# Patient Record
Sex: MALE | Race: WHITE
[De-identification: names, ages, dates, MRNs, and addresses within clinical notes are randomized per-mention and may not be internally consistent; named-entity substitution may affect disease eponyms.]

---

## 2018-10-01 ENCOUNTER — HOSPITAL ENCOUNTER (OUTPATIENT)
Dept: HOSPITAL 35 - PAIN | Age: 73
End: 2018-10-01
Attending: ANESTHESIOLOGY
Payer: COMMERCIAL

## 2018-10-01 VITALS — BODY MASS INDEX: 25.17 KG/M2 | WEIGHT: 156.6 LBS | HEIGHT: 65.98 IN

## 2018-10-01 VITALS — SYSTOLIC BLOOD PRESSURE: 135 MMHG | DIASTOLIC BLOOD PRESSURE: 96 MMHG

## 2018-10-01 DIAGNOSIS — M54.5: ICD-10-CM

## 2018-10-01 DIAGNOSIS — M54.16: Primary | ICD-10-CM

## 2018-10-01 DIAGNOSIS — G89.29: ICD-10-CM

## 2019-06-17 ENCOUNTER — HOSPITAL ENCOUNTER (OUTPATIENT)
Dept: HOSPITAL 35 - PAIN | Age: 74
Discharge: HOME | End: 2019-06-17
Attending: ANESTHESIOLOGY
Payer: COMMERCIAL

## 2019-06-17 VITALS — SYSTOLIC BLOOD PRESSURE: 162 MMHG | DIASTOLIC BLOOD PRESSURE: 99 MMHG

## 2019-06-17 VITALS — HEIGHT: 65.98 IN | WEIGHT: 167.8 LBS | BODY MASS INDEX: 26.97 KG/M2

## 2019-06-17 DIAGNOSIS — M54.16: Primary | ICD-10-CM

## 2019-06-17 DIAGNOSIS — G89.29: ICD-10-CM

## 2019-06-17 DIAGNOSIS — Z98.890: ICD-10-CM

## 2019-06-17 DIAGNOSIS — M19.90: ICD-10-CM

## 2019-06-17 DIAGNOSIS — Z79.891: ICD-10-CM

## 2019-06-17 DIAGNOSIS — N40.1: ICD-10-CM

## 2019-06-17 NOTE — NUR
Pain Clinic Assessment:
 
1. History of Osteoarthritis:
KNEES, HANDS
   History of Rheumatoid Arthritis:
Not Applicable
 
2. Height: 5 ft. 6 in. 167.6 cm.
   Weight: 167.8 lb.  oz. 76.114 kg.
   Patient's BMI: 27.1
 
3. Vital Signs:
   BP: 162/99 Pulse: 79 Resp: 14
   Temp:  02 Sat: 92 ECG Mon:
 
4. Pain Intensity: 4-5
 
5. Fall Risk:
   Dizziness: N  Needs help standing or walking: N
   Fallen in the last 3 months: N
   Fall risk comments:
 
 
6. Patient on Blood Thinner: None
 
7. History of Hypertension: N
 
8. Opioid Therapy greater than 6 weeks: N
   Opiate Contract Signed:
 
9. Risk Assessment Tool Provided: 3-low risk
 
10. Functional Assessment Tool: 13/70
 
11. Recreational Drug Use: Never Drug Type:
    Tobacco Use: Never Smoker Tobacco Type:
       Amount or Packs/day:  How Many Years:
    Alcohol Use: Yes  Frequency:  Quant:

## 2019-09-05 ENCOUNTER — HOSPITAL ENCOUNTER (OUTPATIENT)
Dept: HOSPITAL 35 - PAIN | Age: 74
End: 2019-09-05
Attending: ANESTHESIOLOGY
Payer: COMMERCIAL

## 2019-09-05 VITALS — DIASTOLIC BLOOD PRESSURE: 97 MMHG | SYSTOLIC BLOOD PRESSURE: 142 MMHG

## 2019-09-05 VITALS — HEIGHT: 65 IN | BODY MASS INDEX: 26.29 KG/M2 | WEIGHT: 157.8 LBS

## 2019-09-05 DIAGNOSIS — M54.16: Primary | ICD-10-CM

## 2019-09-05 NOTE — HPC
[facility]
[address]
[place]   [address]                     PAIN MANAGEMENT CONSULTATION  
_______________________________________________________________________________
 
Name:       CINDY DAMON          Room #:                     REG CLI 
M.R.#:      [id number]                       Account #:      [id number]  
Admission:  09/05/19    Attend Phys:    Brayan Stewart MD 
Discharge:              Date of Birth:  [birth year]  
                                                          Report #: [id number]
                                                                    [id number]   
_______________________________________________________________________________
THIS REPORT FOR:   //name//                          
 
CC: STEPH Stewart
 
DATE OF SERVICE:  09/05/2019
 
 
Followup visit for right lumbar radiculopathy, status post L4-L5 posterior
fusion.
 
The patient returns to pain clinic today for consideration of an additional
epidural injection.  He had a nice response to his initial injection performed
on 06/17/2019.  He had almost complete pain relief for over a month.  Pain is
now gradually returning back to baseline.  He reports he is still about 50%
better.  Beginning in his right thigh, it radiates down to the right ankle
following a dermatomal distribution of L5.  There is no weakness.
 
PQRS REVIEW:
1.  History of osteoarthritis involving knees and hands.
2.  BMI has remained stable at 26.3.
3.  Pain intensity 2/10.
4.  No blood thinners.
5.  No history of hypertension.
6.  No opioid use.  Opioid risk tool score is 3/10.
7.  Functional assessment score 13/70 and unchanged.
8.  Denies use of tobacco, drinks alcohol in social settings.
 
PHYSICAL EXAMINATION:
VITAL SIGNS:  As noted above.
GENERAL:  He moves independently from sitting to standing position, walks with
mild antalgic features today.  He has no local numbness or weakness.  Deep
tendon reflexes are absent bilaterally at knees and ankles.  Positive straight
leg raising is noted following the L5 distribution on the right.
 
IMPRESSION:  Lumbar radiculopathy, right L5-S1.  He has had a previous
laminectomy, facetectomy and pedicle screw instrumentation that is unilateral on
the right.  We will proceed today with epidural injection under fluoroscopic
guidance using a transforaminal approach.
 
PROCEDURE:  He was taken to fluoroscopic suite, placed prone, skin prepped with
ChloraPrep, skin anesthetized over the L5-S1 neural foramen.  Using triplanar
fluoroscopic views, I advanced needle into the neural foramen.  A 1 mL of
Omnipaque was injected, excellent spread of dye was observed into the epidural
space, was followed then by 3 mL of 0.5% lidocaine mixed with 80 mg of
 
 
 
[address]   [address]                     PAIN MANAGEMENT CONSULTATION  
_______________________________________________________________________________
 
Name:       CINDY DAMON          Room #:                     REG CLI 
BELEN.#:      [id number]                       Account #:      [id number]  
Admission:  09/05/19    Attend Phys:    Brayan Stewart MD 
Discharge:              Date of Birth:  [birth year]  
                                                          Report #: [id number]
                                                                    [id number]   
_______________________________________________________________________________
triamcinolone.  By the way, I am using triamcinolone again, you do not need to
____ it is boring, you do not want to hear it, but I mentioned yesterday I was
switching everyone to dexamethasone, so when I am dictating triamcinolone, I
mean triamcinolone, so I injected 0.5% lidocaine with 80 mg of triamcinolone. 
He tolerated the procedure well.  There were no complications.  He was taken to
recovery room for observation and discharged.  Followup visit planned on an as
needed basis going forward.
 
 
 
 
 
 
 
 
 
 
 
 
 
 
 
 
 
 
 
 
 
 
 
 
 
 
 
 
 
 
 
 
 
 
 
 
 
                         
   By:                               
                   
D: 09/05/19 1818                           _____________________________________
T: 09/06/19 0150                           Brayan Stewart MD           /nt

## 2019-09-05 NOTE — NUR
Pain Clinic Assessment:
 
1. History of Osteoarthritis:
KNEES, HANDS
   History of Rheumatoid Arthritis:
Not Applicable
 
2. Height: 5 ft. 5 in. 165.1 cm.
   Weight: 157.8 lb.  oz. 71.578 kg.
   Patient's BMI: 26.3
 
3. Vital Signs:
   BP: 142/97 Pulse: 74 Resp: 16
   Temp:  02 Sat: 97 ECG Mon:
 
4. Pain Intensity: 2
 
5. Fall Risk:
   Dizziness: N  Needs help standing or walking: N
   Fallen in the last 3 months: N
   Fall risk comments:
 
 
6. Patient on Blood Thinner: None
 
7. History of Hypertension: N
 
8. Opioid Therapy greater than 6 weeks: N
   Opiate Contract Signed:
 
9. Risk Assessment Tool Provided: 3-low risk
 
10. Functional Assessment Tool: 13/70
 
11. Recreational Drug Use: Never Drug Type:
    Tobacco Use: Never Smoker Tobacco Type:
       Amount or Packs/day:  How Many Years:
    Alcohol Use: Yes  Frequency:  Quant:

## 2019-09-30 ENCOUNTER — HOSPITAL ENCOUNTER (OUTPATIENT)
Dept: HOSPITAL 35 - PAIN | Age: 74
Discharge: HOME | End: 2019-09-30
Attending: ANESTHESIOLOGY
Payer: COMMERCIAL

## 2019-09-30 VITALS — BODY MASS INDEX: 25.05 KG/M2 | HEIGHT: 67.01 IN | WEIGHT: 159.6 LBS

## 2019-09-30 VITALS — DIASTOLIC BLOOD PRESSURE: 97 MMHG | SYSTOLIC BLOOD PRESSURE: 153 MMHG

## 2019-09-30 DIAGNOSIS — N40.1: ICD-10-CM

## 2019-09-30 DIAGNOSIS — Z98.890: ICD-10-CM

## 2019-09-30 DIAGNOSIS — Z79.899: ICD-10-CM

## 2019-09-30 DIAGNOSIS — G89.29: ICD-10-CM

## 2019-09-30 DIAGNOSIS — M54.16: Primary | ICD-10-CM

## 2019-09-30 DIAGNOSIS — M96.1: ICD-10-CM

## 2019-09-30 NOTE — NUR
Pain Clinic Assessment:
 
1. History of Osteoarthritis:
KNEES, HANDS
   History of Rheumatoid Arthritis:
Not Applicable
 
2. Height: 5 ft. 7 in. 170.2 cm.
   Weight: 159.6 lb.  oz. 72.394 kg.
   Patient's BMI: 25.0
 
3. Vital Signs:
   BP: 153/97 Pulse: 77 Resp: 18
   Temp:  02 Sat: 97 ECG Mon:
 
4. Pain Intensity: 2
 
5. Fall Risk:
   Dizziness: N  Needs help standing or walking: N
   Fallen in the last 3 months: N
   Fall risk comments:
 
 
6. Patient on Blood Thinner: None
 
7. History of Hypertension: N
 
8. Opioid Therapy greater than 6 weeks: N
   Opiate Contract Signed:
 
9. Risk Assessment Tool Provided: 3-low risk
 
10. Functional Assessment Tool: 13/70
 
11. Recreational Drug Use: Never Drug Type:
    Tobacco Use: Never Smoker Tobacco Type:
       Amount or Packs/day:  How Many Years:
    Alcohol Use: Yes  Frequency:  Quant:

## 2019-10-10 NOTE — HPC
[facility]
[address]
[place]   [address]                     PAIN MANAGEMENT CONSULTATION  
_______________________________________________________________________________
 
Name:       CINDY DAMON          Room #:                     REG CLI 
M.R.#:      [id number]                       Account #:      [id number]  
Admission:  09/30/19    Attend Phys:    Brayan Stewart MD 
Discharge:              Date of Birth:  [birth year]  
                                                          Report #: [id number]
                                                                    [id number]   
_______________________________________________________________________________
THIS REPORT FOR:   //name//                          
 
CC: STEPH Stewart
 
DATE OF SERVICE:  09/30/2019
 
 
Followup visit for lumbar radiculopathy.
 
The patient returns to pain clinic today in followup.  I saw him last on
09/05/2019.  I have amended his note from that date.  Later in the evening,
while dictating, I noted his x-ray films from her prior visit and dictated an
epidural steroid injection on that visit of 09/05/2019.  He did not have an
injection on that visit.  It was consultation only.  His last epidural injection
was on 06/17/2019.  At his visit, we had discussed returning at a later time for
the injection and he is here today for that injection.  He complains of pain
today at a level of 2, but it can be much more severe later in the day.  It is a
sharp, aching pain exacerbated by movement and walking.  It is alleviated when
he sits down.  Pain nearly completely goes away.
 
PHYSICAL EXAMINATION:
GENERAL:  He is a pleasant wiry 74-year-old gentleman.  He appears to be in good
shape and is wearing supporting clothes.
VITAL SIGNS:  His blood pressure is 153/97, heart rate 77, respirations 18.  BMI
is 25.0.  He moves independently from sitting to standing position.  Gait is
mildly antalgic.
CHEST:  Clear.
CARDIAC:  Rhythm is regular.
MUSCULOSKELETAL:  Examination of his back reveals a scar from previous surgery. 
He has had a lower left-sided fusion at L4.  He has limited range of motion in
extension, which reproduces some pain radiating into his right thigh and into
his right calf.  Sensation is intact.  There is no focal weakness.
 
IMPRESSION:  Low back pain with radiculopathy, post-laminectomy fusion on the
right.  Pain follows the right L5 nerve root.  He has responded well to previous
right L5-S1 transforaminal epidural injection.
 
PROCEDURE:  Right L5-S1 transforaminal.  He was taken to fluoroscopic suite,
where was placed in prone position.  Skin was prepped with ChloraPrep.  Skin was
anesthetized over the L5-S1 interspace.  Using triplanar fluoroscopic views, I
advanced the needle into the neural foramen at L5-S1.  I injected 1 mL of
Omnipaque.  Spread of dye was seen into the epidural space and along the L5
nerve root extending distally.  I repositioned the needle slightly.  An
additional injection of dye showed majority of the medication spreading into the
 
 
 
[address]   [address]                     PAIN MANAGEMENT CONSULTATION  
_______________________________________________________________________________
 
Name:       CINDY DAMON          Room #:                     REG CLI 
BELEN.#:      [id number]                       Account #:      [id number]  
Admission:  09/30/19    Attend Phys:    Brayan Stewart MD 
Discharge:              Date of Birth:  [birth year]  
                                                          Report #: [id number]
                                                                    [id number]   
_______________________________________________________________________________
epidural space.  This was then followed by 3 mL of 0.5% lidocaine mixed with 60
mg of triamcinolone.  He tolerated the procedure well and was taken to the
recovery room for observation and discharged shortly after.  Little change in
his pain score at the time of discharge.
 
We noted today, during his procedure, picked up by the shrewd eyes of the
radiographic technician, Sonu, that the lower pedicle screw was fractured within
the pedicle.  I do not believe this is creating significant instability, but it
is something to be noted.  He may want to return to Dr. Rose to discuss this
further, if his pain continues to worsen or does not improve after further
treatments.
 
He was discharged to be seen on followup visit as needed.
 
 
 
 
 
 
 
 
 
 
 
 
 
 
 
 
 
 
 
 
 
 
 
 
 
 
 
 
 
 
 
  <ELECTRONICALLY SIGNED>
   By: Brayan Stewart MD         
  10/10/19     1652
D: 09/30/19 1730                           _____________________________________
T: 10/01/19 0645                           Brayan Stewart MD           /nt

## 2020-04-02 ENCOUNTER — HOSPITAL ENCOUNTER (OUTPATIENT)
Dept: HOSPITAL 35 - PAIN | Age: 75
Discharge: HOME | End: 2020-04-02
Attending: ANESTHESIOLOGY
Payer: COMMERCIAL

## 2020-04-02 VITALS — HEIGHT: 67.01 IN | BODY MASS INDEX: 24.96 KG/M2 | WEIGHT: 159 LBS

## 2020-04-02 VITALS — SYSTOLIC BLOOD PRESSURE: 140 MMHG | DIASTOLIC BLOOD PRESSURE: 105 MMHG

## 2020-04-02 DIAGNOSIS — Z79.899: ICD-10-CM

## 2020-04-02 DIAGNOSIS — Z98.890: ICD-10-CM

## 2020-04-02 DIAGNOSIS — M54.16: Primary | ICD-10-CM

## 2020-04-02 DIAGNOSIS — G89.29: ICD-10-CM

## 2020-04-02 NOTE — HPC
[facility]
[address]
[place]   [address]                     PAIN MANAGEMENT CONSULTATION  
_______________________________________________________________________________
 
Name:       CINDY DAMON          Room #:                     REG CLI 
MONICAIsaacJILLIAN.#:      [id number]                       Account #:      [id number]  
Admission:  04/02/20    Attend Phys:    Brayan Stewart MD 
Discharge:              Date of Birth:  [birth year]  
                                                          Report #: [id number]
                                                                    [id number]   
_______________________________________________________________________________
THIS REPORT FOR:  
 
cc:  Jose Negron II, MD, II,Jose Stewart,Brayan WALL MD                                         ~
CC: Dr. Vitaliy Stewart
 
DATE OF SERVICE:  04/02/2020
 
 
Followup visit for lumbar radiculopathy, right L5-S1 distribution.
 
The patient returns to pain clinic today for repeat right L5-S1 transforaminal
epidural injection.  He was last given an injection on 09/30/2019 with
outstanding results.  His pain was quite severe at that time and he had dramatic
improvement for many months.  The pain is now returning.  It follows
consistently in L5 distribution down through the thigh and into the calf and
foot.  He has been seeing a physical therapist who assessed him and thought that
he might also had tibial band syndrome and has been giving him some exercises
that may be helping somewhat.
 
Given his rather dramatic improvement following his injection in September, we
felt fairly confident that he has a radicular component to his pain as well. 
His x-ray shows the right L4-L5 fusion.  This often times results in
hypermobility in the lower segment below that and this would be consistent with
the L5 radicular symptoms.
 
PQRS REVIEW:  Completed today, describes osteoarthritis of the knees and hands. 
I have referred him to Dr. Roscoe King for injections.  His BMI is 24.9.  Blood
pressure 140/105, heart rate 80, respirations 16, O2 sat 94, pain intensity 7/10
with activity.  He denies falls, is on no blood thinning medication nor is he on
hypertensives or opioids.  He completed an opioid risk tool with a score of 3,
which is considered low risk.  He does not smoke.  He drinks alcohol socially.
 
PHYSICAL EXAMINATION:
VITAL SIGNS:  As noted.  He moves independently, walks with antalgic features.
MUSCULOSKELETAL:  He has tenderness across his low back and pain in his right
leg.  Straight leg raising does reproduce pain into the L5 distribution.
 
IMPRESSION:  Right L5 radiculopathy, status post lumbar fusion.
 
PROCEDURE:  Right L5-S1 transforaminal epidural injection using triplanar
fluoroscopic views.
 
 
 
 
[address]   [address]                     PAIN MANAGEMENT CONSULTATION  
_______________________________________________________________________________
 
Name:       CINDY DAMON          Room #:                     REG CLDAIN VÁZQUEZ#:      [id number]                       Account #:      [id number]  
Admission:  04/02/20    Attend Phys:    Brayan Stewart MD 
Discharge:              Date of Birth:  [birth year]  
                                                          Report #: [id number]
                                                                    [id number]   
_______________________________________________________________________________
After informed consent, he was taken to the fluoroscopic suite, placed prone,
skin prepped with ChloraPrep.  Skin anesthetized over the L5-S1 neural foramen. 
Using triplanar fluoroscopic views, I advanced the needle into the epidural
space on the first attempt using loss of resistance.  I injected 0.25 mL of
Omnipaque in an excellent spread along the L5 nerve root into the epidural space
and then also along the nerve root extending caudad was identified.  I then
injected a total of 60 mg of 0.5% lidocaine mixed with 60 mg of triamcinolone. 
He tolerated the procedure well and was observed for 45 minutes and discharged.
 
He had been taking a Dosepak for allergies.  I have instructed him to
discontinue the Dosepak at this time.  This injection should allow him to taper
off as it is absorbed and dissipates.
 
Followup visit planned on an as needed basis.  No medications ordered.
 
 
 
 
 
 
 
 
 
 
 
 
 
 
 
 
 
 
 
 
 
 
 
 
 
 
 
 
 
 
                         
   By:                               
                   
D: 04/02/20 1529                           _____________________________________
T: 04/02/20 1623                           Brayan Stewart MD           /nt

## 2020-04-02 NOTE — NUR
Pain Clinic Assessment:
 
1. History of Osteoarthritis:
KNEES, HANDS
   History of Rheumatoid Arthritis:
Not Applicable
 
2. Height: 5 ft. 7 in. 170.2 cm.
   Weight: 159.0 lb.  oz. 72.122 kg.
   Patient's BMI: 24.9
 
3. Vital Signs:
   BP: 140/105 Pulse: 80 Resp: 16
   Temp:  02 Sat: 94 ECG Mon:
 
4. Pain Intensity: 6-7 W/ACTIVITY
 
5. Fall Risk:
   Dizziness: N  Needs help standing or walking: N
   Fallen in the last 3 months: N
   Fall risk comments:
 
 
6. Patient on Blood Thinner: None
 
7. History of Hypertension: N
 
8. Opioid Therapy greater than 6 weeks: N
   Opiate Contract Signed:
 
9. Risk Assessment Tool Provided: 3-low risk
 
10. Functional Assessment Tool: 13/70
 
11. Recreational Drug Use: Never Drug Type:
    Tobacco Use: Never Smoker Tobacco Type:
       Amount or Packs/day:  How Many Years:
    Alcohol Use: Yes  Frequency:  Quant:

## 2020-06-26 ENCOUNTER — HOSPITAL ENCOUNTER (OUTPATIENT)
Dept: HOSPITAL 35 - PAIN | Age: 75
Discharge: HOME | End: 2020-06-26
Attending: ANESTHESIOLOGY
Payer: COMMERCIAL

## 2020-06-26 VITALS — SYSTOLIC BLOOD PRESSURE: 152 MMHG | DIASTOLIC BLOOD PRESSURE: 105 MMHG

## 2020-06-26 VITALS — WEIGHT: 158.6 LBS | HEIGHT: 67.01 IN | BODY MASS INDEX: 24.89 KG/M2

## 2020-06-26 DIAGNOSIS — M96.1: ICD-10-CM

## 2020-06-26 DIAGNOSIS — N40.1: ICD-10-CM

## 2020-06-26 DIAGNOSIS — Z79.899: ICD-10-CM

## 2020-06-26 DIAGNOSIS — M54.16: Primary | ICD-10-CM

## 2020-06-26 NOTE — NUR
Pain Clinic Assessment:
 
1. History of Osteoarthritis:
KNEES, HANDS
   History of Rheumatoid Arthritis:
Not Applicable
 
2. Height: 5 ft. 7 in. 170.2 cm.
   Weight: 158.6 lb.  oz. 71.940 kg.
   Patient's BMI: 24.8
 
3. Vital Signs:
   BP: 152/105 Pulse: 83 Resp: 14
   Temp:  02 Sat: 97 ECG Mon:
 
4. Pain Intensity: 6 WITH ACTIVITY
 
5. Fall Risk:
   Dizziness: N  Needs help standing or walking: N
   Fallen in the last 3 months: N
   Fall risk comments:
 
 
6. Patient on Blood Thinner: None
 
7. History of Hypertension: N
 
8. Opioid Therapy greater than 6 weeks: N
   Opiate Contract Signed:
 
9. Risk Assessment Tool Provided: 3-low risk
 
10. Functional Assessment Tool: 45/70
 
11. Recreational Drug Use: Never Drug Type:
    Tobacco Use: Never Smoker Tobacco Type:
       Amount or Packs/day:  How Many Years:
    Alcohol Use: Yes  Frequency:  Quant:

## 2020-07-06 NOTE — HPC
[facility]
[place]   [address]                     PAIN MANAGEMENT CONSULTATION  
_______________________________________________________________________________
 
Name:       CINDY DAMON          Room #:                     REG CLI 
M.R.#:      [id number]                       Account #:      [id number]  
Admission:  06/26/20    Attend Phys:    Brayan Stewart MD 
Discharge:              Date of Birth:  [birth year]  
                                                          Report #: [id number]
                                                                    [id number]   
_______________________________________________________________________________
THIS REPORT FOR:  
 
cc:  Jose Negron II, MD, II,Jose Stewart,Brayan WALL MD                                         ~
CC: Jose Stewart
 
DATE OF SERVICE:  06/26/2020
 
 
Followup visit for right lumbar radiculopathy L5 distribution, status post L4-L5
transpedicular fusion.  The patient is a pleasant 75-year-old who has a
radicular pain into the right leg.  He has had intermittent transforaminal
epidural injections.  An injection performed in September of last year provided
nearly 6 months of pain relief.  He had a repeat injection at his last visit,
in review of Ann-Marie films looks almost identical.  There may have been
a bit less dye extending into the epidural space, but still coverage of the L5
nerve root.  He says that that injection provided relief, but not nearly as
helpful as the one before.  We decided we will repeat the injection today trying
to extend medicine further into the epidural space with a more lateral approach.
 
He asked about other options and I discussed use of medications as one option as
well as extensive discussion about spinal cord stimulation.  He may be a
candidate, but I presented him with pros, cons, risks, and benefits and he is
going to consider it.  If we can provide relief without resorting to an implant
that costs tens of thousands of dollars and carries surgical risk and a
significant failure rate, I'd prefer that.   He agrees. 
 
PQRS today is positive for osteoarthritis in knees and hands.  His blood
pressure is 140/105, heart rate 80, respirations 16.  His BMI is 24.9.  Pain
intensity 4-6/10.  He is not a fall risk and has not fallen recently.  He is on
no blood thinners, no history of hypertension, no opioids.  He is at low risk
for addiction with score of 3 by the ORT.  Functional assessment score is 13, so
he manages his pain pretty well.  Does not smoke, enjoys alcohol on occasion.
 
IMPRESSION:
1.  Post-laminectomy with fusion.
2.  Lumbar radiculopathy, right L5 distribution.
 
PROCEDURE:  Right L5-S1 transforaminal epidural injection.
 
DESCRIPTION OF PROCEDURE:  After informed consent, he was taken to the
fluoroscopic suite, placed prone, skin prepped with ChloraPrep.  Skin
anesthetized over the L5-S1 neural foramen.  Using triplanar fluoroscopic views,
I advanced needle into the epidural space.  I repositioned on a couple of
 
 
 
[address]   [address]                     PAIN MANAGEMENT CONSULTATION  
_______________________________________________________________________________
 
Name:       CINDY DAMON          Room #:                     REG CLDAIN VÁZQUEZ#:      [id number]                       Account #:      [id number]  
Admission:  06/26/20    Attend Phys:    Brayan Stewart MD 
Discharge:              Date of Birth:  [birth year]  
                                                          Report #: [id number]
                                                                    [id number]   
_______________________________________________________________________________
occasions trying to get deeper into the epidural space in order to get similar
spread of dye.  Actually, I accomplished a diskogram at L5-S1, exclamation
point.  The needle was removed slightly and the dye was then seen to spread more
around the anterior aspect of the epidural space.  It was followed by 3 mL of
0.5% lidocaine mixed with 80 mg of triamcinolone.  He tolerated the procedure
well and was taken to the recovery room for observation.
 
Hopefully, he will see improved and more extensive duration of response with
this injection.  We will see him back as needed.
 
 
 
 
 
 
 
 
 
 
 
 
 
 
 
 
 
 
 
 
 
 
 
 
 
 
 
 
 
 
 
 
 
 
 
  <ELECTRONICALLY SIGNED>
   By: Brayan Stewart MD         
  07/06/20     0929
D: 06/26/20 1425                           _____________________________________
T: 06/26/20 1930                           Brayan Stewart MD           /nt

## 2020-10-22 ENCOUNTER — HOSPITAL ENCOUNTER (OUTPATIENT)
Dept: HOSPITAL 35 - PAIN | Age: 75
End: 2020-10-22
Attending: ANESTHESIOLOGY
Payer: COMMERCIAL

## 2020-10-22 VITALS — DIASTOLIC BLOOD PRESSURE: 95 MMHG | SYSTOLIC BLOOD PRESSURE: 134 MMHG

## 2020-10-22 VITALS — HEIGHT: 67.01 IN | BODY MASS INDEX: 24.17 KG/M2 | WEIGHT: 154 LBS

## 2020-10-22 DIAGNOSIS — M54.16: Primary | ICD-10-CM

## 2020-10-22 DIAGNOSIS — Z79.891: ICD-10-CM

## 2020-10-22 DIAGNOSIS — M96.1: ICD-10-CM

## 2020-10-22 NOTE — HPC
[facility]
[address]
[place]   [address]                     PAIN MANAGEMENT CONSULTATION  
_______________________________________________________________________________
 
Name:       CINDY DAMON          Room #:                     REG CLI 
M.R.#:      [id number]                       Account #:      [id number]  
Admission:  10/22/20    Attend Phys:    Brayan Stewart MD 
Discharge:              Date of Birth:  [birth year]  
                                                          Report #: [id number]
                                                                    [id number]   
_______________________________________________________________________________
CC: STEPH Stewart
 
DATE OF SERVICE:  10/22/2020
 
 
This is a 20-minute consultation for chronic back pain, status post fusion.
 
The patient returns to pain clinic today to discuss his injections.  His last
injection really was quite helpful, more so than the prior injections.  He has
had L5-S1 transforaminal injections.  We reviewed the films and it was
interesting to note the difference.  The last injection included a diskogram. 
The needle was well away from the disk, so it is likely that the disk was
protruding posteriorly into the canal.  I did not directly injected disk with
triamcinolone, but I did pull the needle back slightly and then inject the
transforaminal epidural injection once an epidurogram was achieved.  He
responded with excellent pain relief and has now been over 4 months.  He does
not feel that he needs an injection today as his pain is down to a 2.  He was
anticipating that he may be needing an injection and made an appointment
earlier.
 
He has a fusion only on the right side involving L4 and L5.  These are
transpedicular screws.  It does put some hypermobility in the L5-S1 segment and
I drew a picture demonstrating what likely was occurring at the disk level.  I
suspect that there is lateral recess and narrowing of the neural foramina based
upon the discogram and what we saw on the x-rays performed during his last
injection.  Whether we should consider a discogenic injection of triamcinolone
or other discogenic treatment in the future remains to be seen.  We will see how
long this current treatment lasts.
 
He continues to remain fairly active.  He was given pain medication a couple of
years ago, but does not take it at all now.
 
PQRS:  Positive for osteoarthritis, mostly in both knees and hands.  Blood
pressure today is 134/95, heart rate 94, respirations 14, O2 sat 95 and his BMI
is 24.1.  Pain intensity 2.  He is not a fall risk.  He is on no blood thinners.
 He has no history of hypertension.  His opioid risk score is low and his
functional assessment score 45, which is an improvement from prior number.  Does
not smoke.  Drinks alcohol occasionally.
 
IMPRESSION:
1.  Post-laminectomy with fusion.
2.  L5 radiculopathy.
3.  Possibility of discogenic pain at the L5-S1 disk based upon recent findings
and injection response.
 
 
PLAN:  I will see him back as needed.  We will perform at L5-S1 transforaminal
epidural injections as needed and if we do achieve a diskogram during the
performance of the procedure, we will inject a small amount of triamcinolone.
 
At last visit, we discussed spinal cord stimulation, which he was currently
uninterested in pursuing.  He understands that other options for treatment are
available to him.  Multiple questions were asked and answered during this
20-minute followup visit.
 
 
 
 
 
 
 
 
 
 
 
 
 
 
 
 
 
 
 
 
 
 
 
 
 
 
 
 
 
 
 
 
 
 
 
 
 
 
 
 
 
 
 
 
 
 
 
 
 
                         
   By:                               
                   
D: 10/22/20 1550                           _____________________________________
T: 10/23/20 0111                           Brayan Stewart MD           /nt

## 2020-10-22 NOTE — NUR
Pain Clinic Assessment:
 
1. History of Osteoarthritis:
KNEES, HANDS
   History of Rheumatoid Arthritis:
Not Applicable
 
2. Height: 5 ft. 7 in. 170.2 cm.
   Weight: 154.0 lb.  oz. 69.854 kg.
   Patient's BMI: 24.1
 
3. Vital Signs:
   BP: 134/95 Pulse: 94 Resp: 14
   Temp:  02 Sat: 95 ECG Mon:
 
4. Pain Intensity: 2 NOW   3 PRIOR
 
5. Fall Risk:
   Dizziness: N  Needs help standing or walking: N
   Fallen in the last 3 months: N
   Fall risk comments:
 
 
6. Patient on Blood Thinner: None
 
7. History of Hypertension: N
 
8. Opioid Therapy greater than 6 weeks: N
   Opiate Contract Signed:
 
9. Risk Assessment Tool Provided: 3-low risk
 
10. Functional Assessment Tool: 45/70
 
11. Recreational Drug Use: Never Drug Type:
    Tobacco Use: Never Smoker Tobacco Type:
       Amount or Packs/day:  How Many Years:
    Alcohol Use: Yes  Frequency: Special Occasions Quant:

## 2020-12-08 ENCOUNTER — HOSPITAL ENCOUNTER (OUTPATIENT)
Dept: HOSPITAL 35 - PAIN | Age: 75
Discharge: HOME | End: 2020-12-08
Attending: ANESTHESIOLOGY
Payer: COMMERCIAL

## 2020-12-08 VITALS — SYSTOLIC BLOOD PRESSURE: 152 MMHG | DIASTOLIC BLOOD PRESSURE: 95 MMHG

## 2020-12-08 VITALS — WEIGHT: 160.4 LBS | HEIGHT: 67.01 IN | BODY MASS INDEX: 25.18 KG/M2

## 2020-12-08 DIAGNOSIS — M54.16: Primary | ICD-10-CM

## 2020-12-08 DIAGNOSIS — M19.90: ICD-10-CM

## 2020-12-08 DIAGNOSIS — N40.0: ICD-10-CM

## 2020-12-08 DIAGNOSIS — Z79.899: ICD-10-CM

## 2020-12-08 DIAGNOSIS — M96.1: ICD-10-CM

## 2020-12-08 DIAGNOSIS — Z98.890: ICD-10-CM

## 2020-12-08 NOTE — NUR
Pain Clinic Assessment:
 
1. History of Osteoarthritis:
KNEES, HANDS
   History of Rheumatoid Arthritis:
Not Applicable
 
2. Height: 5 ft. 7 in. 170.2 cm.
   Weight: 160.4 lb.  oz. 72.757 kg.
   Patient's BMI: 25.1
 
3. Vital Signs:
   BP: 152/95 Pulse: 82 Resp: 14
   Temp:  02 Sat: 97 ECG Mon:
 
4. Pain Intensity: 3 NOW
 
5. Fall Risk:
   Dizziness: N  Needs help standing or walking: N
   Fallen in the last 3 months: N
   Fall risk comments:
 
 
6. Patient on Blood Thinner: None
 
7. History of Hypertension: N
 
8. Opioid Therapy greater than 6 weeks: N
   Opiate Contract Signed:
 
9. Risk Assessment Tool Provided: 3-low risk
 
10. Functional Assessment Tool: 45/70
 
11. Recreational Drug Use: Never Drug Type:
    Tobacco Use: Never Smoker Tobacco Type:
       Amount or Packs/day:  How Many Years:
    Alcohol Use: Yes  Frequency:  Quant:

## 2020-12-09 NOTE — HPC
[facility]
[address] [place]   [address]                     PAIN MANAGEMENT CONSULTATION  
_______________________________________________________________________________
 
Name:       CINDY DAMON          Room #:                     REG CLI 
M..#:      [id number]                       Account #:      [id number]  
Admission:  12/08/20    Attend Phys:    Henrik Bateman DO  
Discharge:              Date of Birth:  [birth year]  
                                                          Report #: [id number]
                                                                    [id number]   
_______________________________________________________________________________
THIS REPORT FOR:  
 
cc:  Jose Negron II, MD, II,Jose Bateman,Henrik ELLIS DO                                          ~
 
DATE OF SERVICE:  12/08/2020
 
 
REFERRING PHYSICIAN:  Dr. Jose Negron.
 
CHIEF COMPLAINT:  Low back pain, right lower extremity pain with paresthesias.
 
HISTORY OF PRESENT ILLNESS:  As you know, the patient is a pleasant 75-year-old
male who has had significant surgical changes performed in the lumbar spine with
laminectomies from L2 through L5 and an instrumented fusion of L4-L5 on the
right.  He apparently did very well post-surgery, but he has been experiencing
increasing right leg pain in a radicular fashion, which correlates to L5
distribution that has been treated with injections.  Most recent transforaminal
injection performed on 06/26/2020 gave excellent benefit from a pain standpoint.
 He reported that he received about 60-70% improvement with that injection,
lasting for nearly 6 months.  He returns today in followup visit to discuss the
possibility of undergoing next in the series of transforaminal injections and
any other treatment options might be available.
 
ALLERGIES:  No known drug allergies.
 
CURRENT MEDICATIONS:  Vitamin B. fluticasone, sertraline, magnesium, omega-3
fish oil, multivitamin, Triple-Flex caplets and celecoxib.
 
SOCIAL HISTORY:  The patient reports he is a nonsmoker.  Denies IV or illicit
drug use.  Admits occasional alcohol beverage.  He is retired, retired years
ago, unaccompanied at today's visit.
 
PQRS:  The patient has known arthritic changes of the lumbar spine, bilateral
hands and bilateral knees.  No rheumatoid arthritis.  He is placing current pain
intensity at 3/10.  He is not a fall risk, has not had a fall in last 3 months. 
He is not on blood thinners and not treated for hypertension.  He is not on
chronic opioids, has a low opiate addiction potential.  Pain impact at 45 of 70,
moderate to severe interference of daily activities secondary to pain.
 
PHYSICAL EXAMINATION:
VITAL SIGNS:  Blood pressure 152/95, pulse 82, respiratory rate 14 and
unlabored.  The patient is 97% on room air.  Height 5 feet 7 inches tall, weight
160.4 pounds, BMI calculated 25.1.
GENERAL:  Well-developed, well-nourished, well-hydrated 75-year-old male
 
 
 
[place] [address]                     PAIN MANAGEMENT CONSULTATION  
_______________________________________________________________________________
 
Name:       CINDY DAMON          Room #:                     REG CLI 
[facility].#:      [id number]                       Account #:      [id number]  
Admission:  12/08/20    Attend Phys:    Henrik Bateman DO  
Discharge:              Date of Birth:  [birth year]  
                                                          Report #: [id number]
                                                                    [id number]   
_______________________________________________________________________________
appearing stated age, pain is rated today at around 3/10.
HEENT:  Normocephalic, atraumatic.  Pupils equal, round and reactive.
NEUROLOGIC:  Speech fluent.  The patient deemed a good historian.
EXTREMITIES:  Show no clubbing, no cyanosis, no edema.
MUSCULOSKELETAL:  Lower extremity strength appears symmetrical 5/5, intact to
light touch from L1 through S2 dermatomes.  Seated straight leg raising
negative.  Supine straight leg raising positive on the right.  Ana Lilia's test is
negative.  Modified Gaenslen's positive for axial low back pain.  Ankle clonus
negative.  Babinski is negative.  Gait mildly antalgic favoring right lower
extremity over left.  Well-healed surgical scars in the lumbar region.
 
ASSESSMENT:
1.  Symptomatic lumbar radiculopathy.
2.  Failed lumbar spine surgery.
3.  L5 radiculopathy.
 
PLAN:
1.  The patient returns today in followup visit having noted excellent benefit
with the transforaminal injection provided at the last visit.  The patient
reports he received benefit lasting almost 6 months.  He returns today in
followup visit with the pain score 3/10 stating his pain has slowly and
progressively returned.  He is denying injury or trauma.  He has requested next
in the series of injections to build on success of previous intervention.  The
patient has been advised of the risks and benefits of a transforaminal epidural
injection.  These risks include but are not necessarily limited to bleeding,
bruising, infection, worsening pain, no relief of pain, also risk of temporary
or permanent muscle weakness, temporary or permanent nerve damage, possible
paralysis, post-dural puncture headache and death.  The patient states
understood and wished to proceed.
2.  The patient and I had a discussion today about alternative treatment options
to address his ongoing symptoms.  It was noted that the patient has a fractured
pedicle screw at the L5 level, though it does look stable and does not look like
there is any pathologic movement.  This has been confirmed by his neurosurgeon
who advised no surgical intervention will be necessary in regards to the pedicle
screw.  I do not see any significant changes in the imaging study that would be
concerning of further impingement upon the cord or exiting nerve roots.  We
discussed with the patient the treatment options we have available outside of
the injections.  These would include medication management, utilizing
neuropathic pain medication such as amitriptyline, nortriptyline, Cymbalta,
Lyrica or gabapentin.  We also discussed with the patient possibility of a
spinal cord stimulator providing benefit.  He is currently nonsurgical
candidate, so surgical options would not be necessary.  After reviewing the
other treatment options, we suggested he did request information to be provided
about a spinal cord stimulator.  The patient was given pamphlets for both
Medtronic and Nevro devices, both of which would be quite effective at
controlling symptoms.  He will consider this option and contact our clinic if he
 
 
 
[address]   [address]                     PAIN MANAGEMENT CONSULTATION  
_______________________________________________________________________________
 
Name:       CINDY DAMON          Room #:                     REG CLI 
[facility]#:      [id number]                       Account #:      [id number]  
Admission:  12/08/20    Attend Phys:    Henrik Bateman DO  
Discharge:              Date of Birth:  [birth year]  
                                                          Report #: [id number]
                                                                    [id number]   
_______________________________________________________________________________
is interested in moving forward with a trial, which would have to be preceded by
a psychiatric evaluation, which we discussed today.
3.  No medication changes made at today's visit.  The patient will continue
current medical therapy as prior prescribed.
4.  We will see the patient back in followup visit on an as needed basis for
possible next in the series of transforaminal injections or discuss further the
other treatment options suggested today.
 
PROCEDURE NOTE
DESCRIPTION OF PROCEDURE:  Right L5-S1 transforaminal epidural injection under
fluoroscopic guidance.
 
After obtaining written consent, the patient was taken back to fluoroscopy
suite, placed in prone position with pillow under abdomen to decrease lumbar
lordosis.  Skin overlying the lumbosacral area then prepped and draped in
aseptic fashion.  The L5 vertebrae were identified by fluoroscopy.  Neural
foramen (6 o'clock position of the pedicle) was then identified utilizing a
right oblique fluoroscopic view.  Skin and subcutaneous tissue overlying target
site of injection was anesthetized with 3 mL of 1% lidocaine utilizing a
27-gauge 1-1/4 inch needle.
 
Using a "tunnel view" a 20-gauge 3-1/2 inch Tuohy needle with bent tip was
advanced towards the right epidural space under fluoroscopic guidance.  The
final position of the needle was identified using AP and lateral views.  There
was no pain or paresthesias during final needle positioning.  After negative
aspiration for heme or cerebrospinal fluid, a total of 0.5 mL of Omnipaque was
injected under live AP fluoroscopy, which demonstrated absence of vascular
uptake.  AP and lateral imaging demonstrated excellent epidurogram.  Pain
provocation by the injected contrast material was negative.  After negative
aspiration for heme or cerebrospinal fluid, 3 mL of a solution containing 2 mL,
40 mg per mL, 80 mg total triamcinolone and 1 mL of lidocaine 1%
preservative-free was injected in increments.  Needle was then retracted
approximately half way, flushed with 0.5 mL of lidocaine 1%, then removed. 
Sterile bandage was placed over injection site.  There were no new motor
deficits present in the lower extremity following the procedure.
 
The patient tolerated the procedure well, carefully escorted to recovery room in
stable condition.  No apparent complications.  After meeting our discharge
criteria, the patient discharged home.
 
 
 
 
 
  <ELECTRONICALLY SIGNED>
   By: Henrik Bateman DO          
  12/09/20     1234
D: 12/08/20 1210                           _____________________________________
T: 12/08/20 2159                           Henrik Bateman DO            /nt

## 2021-04-01 ENCOUNTER — HOSPITAL ENCOUNTER (OUTPATIENT)
Dept: HOSPITAL 35 - PAIN | Age: 76
Discharge: HOME | End: 2021-04-01
Attending: ANESTHESIOLOGY
Payer: COMMERCIAL

## 2021-04-01 VITALS — WEIGHT: 158 LBS | HEIGHT: 67.01 IN | BODY MASS INDEX: 24.8 KG/M2

## 2021-04-01 VITALS — DIASTOLIC BLOOD PRESSURE: 95 MMHG | SYSTOLIC BLOOD PRESSURE: 135 MMHG

## 2021-04-01 DIAGNOSIS — Z98.890: ICD-10-CM

## 2021-04-01 DIAGNOSIS — M54.16: Primary | ICD-10-CM

## 2021-04-01 DIAGNOSIS — Z79.899: ICD-10-CM

## 2021-04-01 DIAGNOSIS — M19.90: ICD-10-CM

## 2021-04-01 DIAGNOSIS — M96.1: ICD-10-CM

## 2021-04-01 DIAGNOSIS — M70.61: ICD-10-CM

## 2021-04-01 NOTE — NUR
Pain Clinic Assessment:
 
1. History of Osteoarthritis:
KNEES, HANDS
   History of Rheumatoid Arthritis:
Not Applicable
 
2. Height: 5 ft. 7 in. 170.2 cm.
   Weight: 158.0 lb.  oz. 71.668 kg.
   Patient's BMI: 24.7
 
3. Vital Signs:
   BP: 135/95 Pulse: 81 Resp: 14
   Temp:  02 Sat: 98 ECG Mon:
 
4. Pain Intensity: 6
 
5. Fall Risk:
   Dizziness: N  Needs help standing or walking: N
   Fallen in the last 3 months: N
   Fall risk comments:
 
 
6. Patient on Blood Thinner: None
 
7. History of Hypertension: N
 
8. Opioid Therapy greater than 6 weeks: N
   Opiate Contract Signed:
 
9. Risk Assessment Tool Provided: 3-low risk
 
10. Functional Assessment Tool: 37/70
 
11. Recreational Drug Use: Never Drug Type:
    Tobacco Use: Never Smoker Tobacco Type:
       Amount or Packs/day:  How Many Years:
    Alcohol Use: Yes  Frequency: Special Occasions Quant: 2

## 2021-04-22 ENCOUNTER — HOSPITAL ENCOUNTER (OUTPATIENT)
Dept: HOSPITAL 35 - PAIN | Age: 76
End: 2021-04-22
Attending: ANESTHESIOLOGY
Payer: COMMERCIAL

## 2021-04-22 VITALS — WEIGHT: 156 LBS | HEIGHT: 67.01 IN | BODY MASS INDEX: 24.48 KG/M2

## 2021-04-22 VITALS — SYSTOLIC BLOOD PRESSURE: 138 MMHG | DIASTOLIC BLOOD PRESSURE: 99 MMHG

## 2021-04-22 DIAGNOSIS — Z98.890: ICD-10-CM

## 2021-04-22 DIAGNOSIS — Z79.899: ICD-10-CM

## 2021-04-22 DIAGNOSIS — M54.16: Primary | ICD-10-CM

## 2021-04-22 DIAGNOSIS — M19.042: ICD-10-CM

## 2021-04-22 DIAGNOSIS — M19.041: ICD-10-CM

## 2021-04-22 DIAGNOSIS — Z79.891: ICD-10-CM

## 2021-04-22 DIAGNOSIS — M17.0: ICD-10-CM

## 2021-04-22 NOTE — NUR
Pain Clinic Assessment:
 
1. History of Osteoarthritis:
KNEES, HANDS
   History of Rheumatoid Arthritis:
Not Applicable
 
2. Height: 5 ft. 7 in. 170.2 cm.
   Weight: 156.0 lb.  oz. 70.761 kg.
   Patient's BMI: 24.4
 
3. Vital Signs:
   BP: 138/99 Pulse: 83 Resp: 14
   Temp:  02 Sat: 96 ECG Mon:
 
4. Pain Intensity: 3
 
5. Fall Risk:
   Dizziness: N  Needs help standing or walking: N
   Fallen in the last 3 months: N
   Fall risk comments:
 
 
6. Patient on Blood Thinner: None
 
7. History of Hypertension: N
 
8. Opioid Therapy greater than 6 weeks: N
   Opiate Contract Signed:
 
9. Risk Assessment Tool Provided: 3-low risk
 
10. Functional Assessment Tool: 37/70
 
11. Recreational Drug Use: Never Drug Type:
    Tobacco Use: Never Smoker Tobacco Type:
       Amount or Packs/day:  How Many Years:
    Alcohol Use: Yes  Frequency: Special Occasions Quant: 2

## 2021-06-03 ENCOUNTER — HOSPITAL ENCOUNTER (OUTPATIENT)
Dept: HOSPITAL 35 - PAIN | Age: 76
End: 2021-06-03
Attending: ANESTHESIOLOGY
Payer: COMMERCIAL

## 2021-06-03 VITALS — BODY MASS INDEX: 24.17 KG/M2 | WEIGHT: 154 LBS | HEIGHT: 67.01 IN

## 2021-06-03 VITALS — DIASTOLIC BLOOD PRESSURE: 99 MMHG | SYSTOLIC BLOOD PRESSURE: 140 MMHG

## 2021-06-03 DIAGNOSIS — Z98.1: ICD-10-CM

## 2021-06-03 DIAGNOSIS — G89.29: Primary | ICD-10-CM

## 2021-06-03 NOTE — NUR
Pain Clinic Assessment:
 
1. History of Osteoarthritis:
KNEES, HANDS
   History of Rheumatoid Arthritis:
Not Applicable
 
2. Height: 5 ft. 7 in. 170.2 cm.
   Weight: 154.0 lb.  oz. 69.854 kg.
   Patient's BMI: 24.1
 
3. Vital Signs:
   BP: 140/99 Pulse: 82 Resp: 14
   Temp:  02 Sat: 96 ECG Mon:
 
4. Pain Intensity: 7 W/ACTIVITY
 
5. Fall Risk:
   Dizziness: N  Needs help standing or walking: N
   Fallen in the last 3 months: N
   Fall risk comments:
 
 
6. Patient on Blood Thinner: None
 
7. History of Hypertension: N
 
8. Opioid Therapy greater than 6 weeks: N
   Opiate Contract Signed:
 
9. Risk Assessment Tool Provided: 3-low risk
 
10. Functional Assessment Tool: 37/70
 
11. Recreational Drug Use: Never Drug Type:
    Tobacco Use: Never Smoker Tobacco Type:
       Amount or Packs/day:  How Many Years:
    Alcohol Use: Yes  Frequency:  Quant: